# Patient Record
Sex: FEMALE | Race: WHITE | Employment: FULL TIME | URBAN - METROPOLITAN AREA
[De-identification: names, ages, dates, MRNs, and addresses within clinical notes are randomized per-mention and may not be internally consistent; named-entity substitution may affect disease eponyms.]

---

## 2018-06-25 ENCOUNTER — OFFICE VISIT (OUTPATIENT)
Dept: OBGYN | Facility: CLINIC | Age: 23
End: 2018-06-25
Payer: COMMERCIAL

## 2018-06-25 VITALS
WEIGHT: 169.4 LBS | BODY MASS INDEX: 25.09 KG/M2 | HEIGHT: 69 IN | SYSTOLIC BLOOD PRESSURE: 116 MMHG | DIASTOLIC BLOOD PRESSURE: 74 MMHG

## 2018-06-25 DIAGNOSIS — Z97.5 BREAKTHROUGH BLEEDING ON NEXPLANON: Primary | ICD-10-CM

## 2018-06-25 DIAGNOSIS — N92.1 BREAKTHROUGH BLEEDING ON NEXPLANON: Primary | ICD-10-CM

## 2018-06-25 DIAGNOSIS — Z13.29 SCREENING FOR THYROID DISORDER: ICD-10-CM

## 2018-06-25 PROCEDURE — 99203 OFFICE O/P NEW LOW 30 MIN: CPT | Performed by: ADVANCED PRACTICE MIDWIFE

## 2018-06-25 PROCEDURE — 36415 COLL VENOUS BLD VENIPUNCTURE: CPT | Performed by: ADVANCED PRACTICE MIDWIFE

## 2018-06-25 PROCEDURE — 84443 ASSAY THYROID STIM HORMONE: CPT | Performed by: ADVANCED PRACTICE MIDWIFE

## 2018-06-25 RX ORDER — NORGESTIMATE AND ETHINYL ESTRADIOL 0.25-0.035
1 KIT ORAL DAILY
Qty: 84 TABLET | Refills: 0 | Status: SHIPPED | OUTPATIENT
Start: 2018-06-25 | End: 2019-08-05

## 2018-06-25 NOTE — PROGRESS NOTES
SUBJECTIVE:                                                   Danyelle Davis is a 23 year old who presents to clinic today for the following health issue(s):  Patient presents with:  Abnormal Bleeding Problem: Nexplanon x 3.5 years.  Last inserted 2017.  Has noticed occ changes in bleeding rafa since 3/2018.      HPI: Here to discuss break through bleeding  On nexplanon. Had nexplanon replaced 2017, previous nexplanon did not have bleeding or spotting.  Since having new Nexplanon placed has had increased stress. Recently moved, changed jobs, loss of a relationship. 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 Patient states she has not changed diet, or other aspects and believes that stress is playing a role in irregular spotting/bleeding. LMP , states this was a normal period which she did not get on previous nexplanon. Pap UTD 2017 NIL.     Patient's last menstrual period was 2018.  Menstrual History: irregular   Patient is not sexually active at this time, recently started seeing new partner   .  Using Nexplanon  for contraception.   Health maintenance updated:  yes  STI infx testing offered:  Declined, no new partners since last neg test       Problem list and histories reviewed & adjusted, as indicated.  Additional history: as documented.    There is no problem list on file for this patient.    Past Surgical History:   Procedure Laterality Date     HC TOOTH EXTRACTION W/FORCEP  2014      Social History   Substance Use Topics     Smoking status: Never Smoker     Smokeless tobacco: Never Used     Alcohol use 1.2 - 1.8 oz/week     2 - 3 Glasses of wine per week      Problem (# of Occurrences) Relation (Name,Age of Onset)    Breast Cancer (1) Maternal Grandmother: great grandma also    Hypertension (1) Father: Smoker    No Known Problems (1) Mother            Current Outpatient Prescriptions   Medication Sig     etonogestrel (IMPLANON/NEXPLANON) 68 MG IMPL 1  "each by Subdermal route once     norgestimate-ethinyl estradiol (ORTHO-CYCLEN, SPRINTEC) 0.25-35 MG-MCG per tablet Take 1 tablet by mouth daily     No current facility-administered medications for this visit.      No Known Allergies    ROS:  CONSTITUTIONAL: NEGATIVE for fever, chills, change in weight  RESP: NEGATIVE for significant cough or SOB  CV: NEGATIVE for chest pain, palpitations or peripheral edema  GI: NEGATIVE for nausea, abdominal pain, heartburn, or change in bowel habits  : NEGATIVE for unusual urinary or vaginal symptoms. Periods are regular.  PSYCHIATRIC: NEGATIVE for changes in mood or affect    OBJECTIVE:     /74  Ht 5' 9\" (1.753 m)  Wt 169 lb 6.4 oz (76.8 kg)  LMP 06/17/2018  BMI 25.02 kg/m2  Body mass index is 25.02 kg/(m^2).    PHYSICAL EXAM:  Constitutional:  Appearance: Well nourished, well developed alert, in no acute distress  Chest:  Respiratory Effort:  Breathing unlabored  Cardiovascular: Heart: Auscultation:  Regular rate, normal rhythm, no murmurs present  Neurologic/Psychiatric:  Mental Status:  Oriented X3      In-Clinic Test Results:  No results found for this or any previous visit (from the past 24 hour(s)).    ASSESSMENT/PLAN:                                                        ICD-10-CM    1. Breakthrough bleeding on Nexplanon N92.1 norgestimate-ethinyl estradiol (ORTHO-CYCLEN, SPRINTEC) 0.25-35 MG-MCG per tablet    Z97.8    2. Screening for thyroid disorder Z13.29 TSH with free T4 reflex       PLAN:  -Rx given for 3 mo trial with LYNN, ACHES reviewed.  Expect break through bleeding To resolve on LYNN. Instructed to return to clinic if bleeding through LYNN.   -If break through bleeding  Continues after LYNN trial, may need to consider other BC options     MELISSA Salvador, LEONORA       30 minutes was spent face to face with the patient today discussing her history, diagnosis, and follow-up plan as noted above. Over 50% of the visit was spent in counseling and " coordination of care.

## 2018-06-25 NOTE — NURSING NOTE
"Chief Complaint   Patient presents with     Abnormal Bleeding Problem     Nexplanon x 3.5 years.  Last inserted 2017.  Has noticed occ changes in bleeding arfa since 3/2018.       Initial /74  Ht 5' 9\" (1.753 m)  Wt 169 lb 6.4 oz (76.8 kg)  LMP 2018  BMI 25.02 kg/m2 Estimated body mass index is 25.02 kg/(m^2) as calculated from the following:    Height as of this encounter: 5' 9\" (1.753 m).    Weight as of this encounter: 169 lb 6.4 oz (76.8 kg).  BP completed using cuff size: regular        The following HM Due: NONE      Pap smear done on this date: 2017 (approximately), by this group: unsure, results were Normal per pt.   Unable to retrieve care everywhere information without signature.  And pt has left the clinic.  Will retrieve and next visit    Marcelina Banuelos CMA               "

## 2018-06-25 NOTE — MR AVS SNAPSHOT
"              After Visit Summary   6/25/2018    Danyelle Davis    MRN: [id number]           Patient Information     Date Of Birth          1995        Visit Information        Provider Department      6/25/2018 11:45 AM Madalyn Mandujano APRN CNM [facility]        Today's Diagnoses     Breakthrough bleeding on Nexplanon    -  1    Screening for thyroid disorder           Follow-ups after your visit        Follow-up notes from your care team     Return in about 3 months (around 9/25/2018), or if symptoms worsen or fail to improve.      Who to contact     If you have questions or need follow up information about today's clinic visit or your schedule please contact [facility] directly at [phone].  Normal or non-critical lab and imaging results will be communicated to you by SugarCRMhart, letter or phone within 4 business days after the clinic has received the results. If you do not hear from us within 7 days, please contact the clinic through SugarCRMhart or phone. If you have a critical or abnormal lab result, we will notify you by phone as soon as possible.  Submit refill requests through [employer] or call your pharmacy and they will forward the refill request to us. Please allow 3 business days for your refill to be completed.          Additional Information About Your Visit        MyChart Information     [employer] gives you secure access to your electronic health record. If you see a primary care provider, you can also send messages to your care team and make appointments. If you have questions, please call your primary care clinic.  If you do not have a primary care provider, please call [phone] and they will assist you.        Care EveryWhere ID     This is your Care EveryWhere ID. This could be used by other organizations to access your [place] medical records  [id number]        Your Vitals Were     Height Last Period BMI (Body Mass Index)             5' 9\" (1.753 m) 06/17/2018 " 25.02 kg/m2          Blood Pressure from Last 3 Encounters:   06/25/18 116/74    Weight from Last 3 Encounters:   06/25/18 169 lb 6.4 oz (76.8 kg)              We Performed the Following     TSH with free T4 reflex          Today's Medication Changes          These changes are accurate as of 6/25/18  2:04 PM.  If you have any questions, ask your nurse or doctor.               Start taking these medicines.        Dose/Directions    norgestimate-ethinyl estradiol 0.25-35 MG-MCG per tablet   Commonly known as:  ORTHO-CYCLEN, SPRINTEC   Used for:  Breakthrough bleeding on Nexplanon   Started by:  Madalyn Mandujano APRN CNM        Dose:  1 tablet   Take 1 tablet by mouth daily   Quantity:  84 tablet   Refills:  0            Where to get your medicines      These medications were sent to [place] [id number] [address]  [facility]  [address] [facility] [address]     Phone:  [phone]     norgestimate-ethinyl estradiol 0.25-35 MG-MCG per tablet                Primary Care Provider Fax #    Physician No Ref-Primary [phone]       No address on file        Equal Access to Services     [facility] : Hadyeyo Jimenez, wamilvia kruger, qamiriam pang, randa culp . So [facility] [phone].    ATENCIÓN: Si habla español, tiene a murphy disposición servicios gratuitos de asistencia lingüística. [facility] [phone].    We comply with applicable federal civil rights laws and [place] laws. We do not discriminate on the basis of race, color, national origin, age, disability, sex, sexual orientation, or gender identity.            Thank you!     Thank you for choosing [facility]  for your care. Our goal is always to provide you with excellent care. Hearing back from our patients is one way we can continue to improve our services. Please take a few minutes to complete the written survey that you may receive in the mail after your visit  with us. Thank you!             Your Updated Medication List - Protect others around you: Learn how to safely use, store and throw away your medicines at www.disposemymeds.org.          This list is accurate as of 6/25/18  2:04 PM.  Always use your most recent med list.                   Brand Name Dispense Instructions for use Diagnosis    etonogestrel 68 MG Impl    IMPLANON/NEXPLANON     1 each by Subdermal route once        norgestimate-ethinyl estradiol 0.25-35 MG-MCG per tablet    ORTHO-CYCLEN, SPRINTEC    84 tablet    Take 1 tablet by mouth daily    Breakthrough bleeding on Nexplanon

## 2018-06-26 LAB — TSH SERPL DL<=0.005 MIU/L-ACNC: 0.96 MU/L (ref 0.4–4)

## 2018-06-27 ENCOUNTER — HEALTH MAINTENANCE LETTER (OUTPATIENT)
Age: 23
End: 2018-06-27

## 2019-07-08 ENCOUNTER — TELEPHONE (OUTPATIENT)
Dept: OBGYN | Facility: CLINIC | Age: 24
End: 2019-07-08

## 2019-07-08 NOTE — TELEPHONE ENCOUNTER
Panel Management Review      Composite cancer screening  Chart review shows that this patient is due/due soon for the following Pap Smear  Summary:    Patient is due/failing the following:   PAP    Action needed:   Patient needs office visit for PapSmear.    Type of outreach:    Sent letter.    Questions for provider review:    None                                                                                                                                    Lelo Boyd MA

## 2019-07-08 NOTE — LETTER
[place] [address]  [address]  [facility] [address]  Phone: [phone]      July 8, 2019    Danyelle Davis  [address] [place] [address]          Dear Danyelle Davis    This is a reminder that you are due for your follow-up Pap Smear.      It is important to follow up on this screening in order to detect any recurrence or continuation of abnormal cells, which could potentially progress to a cancerous condition.    You may call our office at [phone] to schedule an appointment at this clinic.    Please disregard this notice if you have had this procedure repeated or already made an appointment.        Sincerely,    Madalyn Perry CNM

## 2019-08-05 ENCOUNTER — OFFICE VISIT (OUTPATIENT)
Dept: OBGYN | Facility: CLINIC | Age: 24
End: 2019-08-05
Payer: COMMERCIAL

## 2019-08-05 VITALS — DIASTOLIC BLOOD PRESSURE: 68 MMHG | BODY MASS INDEX: 25.52 KG/M2 | SYSTOLIC BLOOD PRESSURE: 112 MMHG | WEIGHT: 172.8 LBS

## 2019-08-05 DIAGNOSIS — Z12.4 SCREENING FOR CERVICAL CANCER: ICD-10-CM

## 2019-08-05 DIAGNOSIS — Z01.419 ENCOUNTER FOR GYNECOLOGICAL EXAMINATION WITHOUT ABNORMAL FINDING: Primary | ICD-10-CM

## 2019-08-05 PROCEDURE — G0145 SCR C/V CYTO,THINLAYER,RESCR: HCPCS | Performed by: OBSTETRICS & GYNECOLOGY

## 2019-08-05 PROCEDURE — 99395 PREV VISIT EST AGE 18-39: CPT | Performed by: OBSTETRICS & GYNECOLOGY

## 2019-08-05 NOTE — NURSING NOTE
"Chief Complaint   Patient presents with     Physical     due for pap smear. No questions or concerns.        Initial /68 (BP Location: Right arm, Patient Position: Chair, Cuff Size: Adult Regular)   Wt 78.4 kg (172 lb 12.8 oz)   LMP 2019   BMI 25.52 kg/m   Estimated body mass index is 25.52 kg/m  as calculated from the following:    Height as of 18: 1.753 m (5' 9\").    Weight as of this encounter: 78.4 kg (172 lb 12.8 oz).  BP completed using cuff size: regular    Questioned patient about current smoking habits.  Pt. has never smoked.          The following HM Due: pap smear      Jared Wu CMA               "

## 2019-08-05 NOTE — PROGRESS NOTES
SUBJECTIVE:                                                   Danyelle Davis is a 24 year old  female who presents to clinic today for preventive health exam.   Periods are regular q 28-30 days. No intermenstrual bleeding,   spotting, or discharge. Not currently sexually active, no dyspareunia.  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     Current contraception: nexplanon, placed 2017  History of abnormal Pap smear: No    ROS:  Const: no weight changes, fever, chills  Breast: no nipple discharge, lumps, skin changes     Family history of breast CA: Yes (Please explain): see below  Family history of uterine/ovarian CA: No  Family history of colon CA: No    There is no problem list on file for this patient.    Past Surgical History:   Procedure Laterality Date     HC TOOTH EXTRACTION W/FORCEP  2014      Social History     Tobacco Use     Smoking status: Never Smoker     Smokeless tobacco: Never Used   Substance Use Topics     Alcohol use: Yes     Alcohol/week: 1.2 - 1.8 oz     Types: 2 - 3 Glasses of wine per week      Problem (# of Occurrences) Relation (Name,Age of Onset)    Breast Cancer (1) Maternal Grandmother: great grandma also    Hypertension (1) Father: Smoker    No Known Problems (1) Mother              Current Outpatient Medications on File Prior to Visit:  etonogestrel (IMPLANON/NEXPLANON) 68 MG IMPL 1 each by Subdermal route once     No current facility-administered medications on file prior to visit.   No Known Allergies    Problem list and histories reviewed and adjusted as indicated.     OBJECTIVE:     /68 (BP Location: Right arm, Patient Position: Chair, Cuff Size: Adult Regular)   Wt 78.4 kg (172 lb 12.8 oz)   LMP 2019   BMI 25.52 kg/m      Gen: Healthy appearing female, no acute distress, comfortable  HENT: No scleral injection or icterus  CV: Regular rate, well perfused, no murmur  Resp: normal work of breathing, no cough or  wheeze  Breast Exam: No visible masses or suspicious skin changes.  No discrete or dominant masses to palpation.  No axillary lymphadenopathy.  GI: Abdomen soft, non-tender. No masses, organomegaly  Skin: No suspicious lesions or rashes  Psychiatric: mentation appears normal and affect bright    : External genitalia normal well-estrogenized, healthy tissue.  No obvious excoriations, lesions, or rashes. Bartholins, urethra, skeins normal.  Normal pink vaginal mucosa.  SSE: Normal cervix, normal physiologic discharge.   Bimanual: No CMT, small mobile anteverted uterus. No adnexal masses or tenderness appreciated.     ASSESSMENT/PLAN:                                                    Danyelle Davis is a 24 year old female  with satisfactory annual exam.    PLAN:  Dx:  1)  Pap smear collected today  2)  Contraception nexplanon, due out 2020    PE:  Reviewed health maintenance including diet, regular exercise   and periodic exams.    Return to clinic in 1 year for routine annual exam.    COUNSELING:   Reviewed preventive health counseling, as reflected in patient instructions   reports that she has never smoked. She has never used smokeless tobacco.        Micaela Haile MD   Obstetrics and Gynecology

## 2019-08-05 NOTE — PATIENT INSTRUCTIONS
Preventive Health Recommendations  Female Ages 18 to 25      Yearly exam:      See your health care provider every year in order to    Review health changes.      Discuss preventive care.       Review your medicines if your doctor has prescribed any.        You should be tested each year for STDs (sexually transmitted diseases).        Starting at age 21, get a Pap test every three years. If you have an abnormal result, your doctor may have you test more often.        If you are at risk for diabetes, you should have a diabetes test (fasting glucose).    Shots:      Get a flu shot each year.      Get a tetanus shot every 10 years.      Consider getting the shot (vaccine) that prevents cervical cancer (Gardasil).    Nutrition:      Eat at least 5 servings of fruits and vegetables each day.    Eat whole-grain bread, whole-wheat pasta and brown rice, quinoa, faro, or barley instead of white grains and rice.    For bone health:  Eat calcium-rich foods or take calcium pills (500 to 600 mg) twice a day with food. Also take vitamin D (1000 IUs) each day.    Your total calcium intake should be 1000mg per day (3 servings of dairy daily or twice daily supplements)    Lifestyle    Exercise at least 150 minutes a week each week (30 minutes a day, 5 days a week). This will help you control your weight and prevent disease. If your goal is to lose weight you will need to increase the duration and intensity of your exercise regimen    Limit alcohol to one drink per day.    No smoking.      Wear sunscreen to prevent skin cancer.    See your dentist every six months for an exam and cleaning.

## 2019-08-08 LAB
COPATH REPORT: NORMAL
PAP: NORMAL

## 2019-12-20 ENCOUNTER — OFFICE VISIT (OUTPATIENT)
Dept: OBGYN | Facility: CLINIC | Age: 24
End: 2019-12-20
Payer: COMMERCIAL

## 2019-12-20 VITALS — DIASTOLIC BLOOD PRESSURE: 70 MMHG | WEIGHT: 170 LBS | BODY MASS INDEX: 25.1 KG/M2 | SYSTOLIC BLOOD PRESSURE: 118 MMHG

## 2019-12-20 DIAGNOSIS — Z11.3 SCREEN FOR STD (SEXUALLY TRANSMITTED DISEASE): ICD-10-CM

## 2019-12-20 DIAGNOSIS — Z97.5 BREAKTHROUGH BLEEDING ON NEXPLANON: Primary | ICD-10-CM

## 2019-12-20 DIAGNOSIS — N92.1 BREAKTHROUGH BLEEDING ON NEXPLANON: Primary | ICD-10-CM

## 2019-12-20 PROCEDURE — 99213 OFFICE O/P EST LOW 20 MIN: CPT | Performed by: OBSTETRICS & GYNECOLOGY

## 2019-12-20 PROCEDURE — 87491 CHLMYD TRACH DNA AMP PROBE: CPT | Performed by: OBSTETRICS & GYNECOLOGY

## 2019-12-20 PROCEDURE — 87591 N.GONORRHOEAE DNA AMP PROB: CPT | Performed by: OBSTETRICS & GYNECOLOGY

## 2019-12-20 NOTE — PROGRESS NOTES
SUBJECTIVE:                                                   Danyelle Davis is a 24 year old female who presents to clinic today for the following health issue(s):  Patient presents with:  Abnormal Uterine Bleeding: Irregular menses - frequent every 2 weeks x 2 months.  Skin changes / frustrated with menses      HPI:  Had Nexplanon placed  which was her second Nexplanon.  Had an episode of irregular bleeding in 2018 treated with OCPs which she only took briefly due to concern about side effects.    New sexual partner in last few months    No LMP recorded. Patient has had an implant..   Patient is sexually active, .  Using Nexplanon for contraception.     Problem list and histories reviewed & adjusted, as indicated.  Additional history: as documented.    There is no problem list on file for this patient.    Past Surgical History:   Procedure Laterality Date     HC TOOTH EXTRACTION W/FORCEP  2014      Social History     Tobacco Use     Smoking status: Never Smoker     Smokeless tobacco: Never Used   Substance Use Topics     Alcohol use: Yes     Alcohol/week: 2.0 - 3.0 standard drinks     Types: 2 - 3 Glasses of wine per week      Problem (# of Occurrences) Relation (Name,Age of Onset)    Breast Cancer (1) Maternal Grandmother: great grandma also    Hypertension (1) Father: Smoker    No Known Problems (1) Mother            etonogestrel (IMPLANON/NEXPLANON) 68 MG IMPL, 1 each by Subdermal route once    No current facility-administered medications on file prior to visit.     No Known Allergies    ROS:  5 point ROS negative except as noted above in HPI, including Gen., Resp., CV, GI &  system review.    OBJECTIVE:     /70   Wt 77.1 kg (170 lb)   BMI 25.10 kg/m     BMI: Body mass index is 25.1 kg/m .  General: Alert and oriented, no distress.  Psychiatric: Mood and affect within normal limits.  Abdomen: Soft, nontender, no hepatosplenomegaly, no rebound or guarding, no masses, no hernias.   Vulva:   No external lesions, normal female hair distribution, no inguinal adenopathy.    Urethra:  Midline, non-tender, well supported, no discharge  Vagina:  Well-estrogenized, no abnormal discharge, no lesions  Cervix: no lesions, no discharge and blood in vault.  GC/Chlamydia PCR obtained  Uterus:  anteverted, smooth contour, without enlargement, mobile, and without tenderness  Ovaries:  No masses appreciated, non-tender, mobile  Rectal Exam: deferred  Musculoskeletal: extremities normal        ASSESSMENT/PLAN:                                                        ICD-10-CM    1. Breakthrough bleeding on Nexplanon N92.1     Z97.5    2. Screen for STD (sexually transmitted disease) Z11.3 NEISSERIA GONORRHOEA PCR     CHLAMYDIA TRACHOMATIS PCR         Discussed options of management for BTB on Nexplanon  Expectant management, use of NSAIDs, LYNN and estrogen/progestin therapy all discussed.    Patient will attempt a trial of ibuprofen 600mg TID x 5-10 days.    Discussed Nexplanaon removal and alternative contraception if bleeding cannot be resolved.  Naldo Penaloza MD  [facility]

## 2019-12-23 LAB
C TRACH DNA SPEC QL NAA+PROBE: NEGATIVE
N GONORRHOEA DNA SPEC QL NAA+PROBE: NEGATIVE
SPECIMEN SOURCE: NORMAL
SPECIMEN SOURCE: NORMAL

## 2020-06-03 ENCOUNTER — OFFICE VISIT (OUTPATIENT)
Dept: OBGYN | Facility: CLINIC | Age: 25
End: 2020-06-03
Payer: COMMERCIAL

## 2020-06-03 ENCOUNTER — RESULTS ONLY (OUTPATIENT)
Dept: LAB | Age: 25
End: 2020-06-03

## 2020-06-03 ENCOUNTER — APPOINTMENT (OUTPATIENT)
Dept: LAB | Facility: CLINIC | Age: 25
End: 2020-06-03
Payer: COMMERCIAL

## 2020-06-03 VITALS — SYSTOLIC BLOOD PRESSURE: 114 MMHG | WEIGHT: 174.7 LBS | DIASTOLIC BLOOD PRESSURE: 70 MMHG | BODY MASS INDEX: 25.8 KG/M2

## 2020-06-03 DIAGNOSIS — N92.1 BREAKTHROUGH BLEEDING ON NEXPLANON: Primary | ICD-10-CM

## 2020-06-03 DIAGNOSIS — Z97.5 BREAKTHROUGH BLEEDING ON NEXPLANON: Primary | ICD-10-CM

## 2020-06-03 PROCEDURE — 99213 OFFICE O/P EST LOW 20 MIN: CPT | Performed by: OBSTETRICS & GYNECOLOGY

## 2020-06-03 NOTE — PROGRESS NOTES
"Chief Complaint   Patient presents with     Abnormal Uterine Bleeding   Continues with changes with menstrual bleeding - OCP rx is complete.   initial Wt 79.2 kg (174 lb 11.2 oz)   LMP 05/21/2020   BMI 25.80 kg/m   Estimated body mass index is 25.8 kg/m  as calculated from the following:    Height as of 6/25/18: 1.753 m (5' 9\").    Weight as of this encounter: 79.2 kg (174 lb 11.2 oz).  BP completed using cuff size regular.  Marcelina Banuelos CMA    "

## 2020-06-03 NOTE — PROGRESS NOTES
SUBJECTIVE:                                                   Danyelle Davis is a 25 year old female who presents to clinic today for the following health issue(s):  Patient presents with:  Abnormal Uterine Bleeding      HPI:  Patient had a Nexplanon inserted 2017 and has had persistent irregular bleeding.  Has tried NSAIDs and OCPs without improvement.  This is her 2nd Nexplanon and had a better bleeding pattern with her first.  Due for removal in 3 months    Discussed and options and will proceed with removal in near future to be followed by OCP use    Patient's last menstrual period was 2020..   Patient is sexually active, .  Using depo or other injectable for contraception.     Problem list and histories reviewed & adjusted, as indicated.  Additional history: as documented.    There is no problem list on file for this patient.    Past Surgical History:   Procedure Laterality Date     HC TOOTH EXTRACTION W/FORCEP  2014      Social History     Tobacco Use     Smoking status: Never Smoker     Smokeless tobacco: Never Used   Substance Use Topics     Alcohol use: Yes     Alcohol/week: 2.0 - 3.0 standard drinks     Types: 2 - 3 Glasses of wine per week      Problem (# of Occurrences) Relation (Name,Age of Onset)    Breast Cancer (1) Maternal Grandmother: great grandma also    Hypertension (1) Father: Smoker    No Known Problems (1) Mother            etonogestrel (IMPLANON/NEXPLANON) 68 MG IMPL, 1 each by Subdermal route once    No current facility-administered medications on file prior to visit.     No Known Allergies    ROS:  5 point ROS negative except as noted above in HPI, including Gen., Resp., CV, GI &  system review.    OBJECTIVE:     /70   Wt 79.2 kg (174 lb 11.2 oz)   LMP 2020   BMI 25.80 kg/m     BMI: Body mass index is 25.8 kg/m .  General: Alert and oriented, no distress.  Psychiatric: Mood and affect within normal limits.      ASSESSMENT/PLAN:                                                       Irregular bleeding on Nexplanon wo resolution with LYNN or NSAIDS.  Due for removal in 3 months anyway so will proceed to removal soon and plan on OCP use post removal.        Naldo Penaloza MD  [facility]

## 2020-06-08 LAB
COVID-19 SPIKE RBD ABY TITER: NORMAL
COVID-19 SPIKE RBD ABY: NEGATIVE

## 2020-06-10 ENCOUNTER — OFFICE VISIT (OUTPATIENT)
Dept: OBGYN | Facility: CLINIC | Age: 25
End: 2020-06-10
Payer: COMMERCIAL

## 2020-06-10 VITALS — SYSTOLIC BLOOD PRESSURE: 112 MMHG | BODY MASS INDEX: 25.7 KG/M2 | WEIGHT: 174 LBS | DIASTOLIC BLOOD PRESSURE: 72 MMHG

## 2020-06-10 DIAGNOSIS — Z30.46 ENCOUNTER FOR NEXPLANON REMOVAL: Primary | ICD-10-CM

## 2020-06-10 PROCEDURE — 11982 REMOVE DRUG IMPLANT DEVICE: CPT | Performed by: OBSTETRICS & GYNECOLOGY

## 2020-06-10 RX ORDER — LEVONORGESTREL/ETHIN.ESTRADIOL 0.1-0.02MG
1 TABLET ORAL DAILY
Qty: 84 TABLET | Refills: 3 | Status: SHIPPED | OUTPATIENT
Start: 2020-06-10 | End: 2021-04-15 | Stop reason: ALTCHOICE

## 2020-06-10 NOTE — NURSING NOTE
"Chief Complaint   Patient presents with     Minor Procedure     Nexplanon Removal     initial /72   Wt 78.9 kg (174 lb)   LMP 05/21/2020   BMI 25.70 kg/m   Estimated body mass index is 25.7 kg/m  as calculated from the following:    Height as of 6/25/18: 1.753 m (5' 9\").    Weight as of this encounter: 78.9 kg (174 lb).  BP completed using cuff size regular.  Marcelina Banuelos CMA    "

## 2020-06-10 NOTE — PROGRESS NOTES
alesseNexplanon Removal:     Is a pregnancy test required: No.  Was a consent obtained?  Yes    Danyelle Davis is here for removal of etonogestrel implant Nexplanon/Implanon    Indication: Irregular bleeding, persistent      Preoperative Diagnosis: etonogestrel implant  Postoperative Diagnosis: etonogestrel implant removed    Technique: On the left arm  Skin prep Betadine  Anesthesia 1% lidocaine, with epi  Procedure: Small incision (<5mm) was made at distal end of palpable implant, curved hemostat or mosquito forceps was used to isolate the implant and bring it to the incision, the fibrous capsule containing the implant  was incised and the Implant was removed intact.    EBL: minimal  Complications:  No  Tolerance:  Pt tolerated procedure well and was in stable condition.   Dressing:    A pressure bandage was placed for the next 12-24 hours.    Contraception was discussed and patient chose the following method oral contraceptives      Follow up: Pt was instructed to call if bleeding, severe pain or foul smell.     Naldo Penaloza MD

## 2020-09-10 ENCOUNTER — OFFICE VISIT (OUTPATIENT)
Dept: INTERNAL MEDICINE | Facility: CLINIC | Age: 25
End: 2020-09-10
Payer: COMMERCIAL

## 2020-09-10 VITALS
BODY MASS INDEX: 26.66 KG/M2 | SYSTOLIC BLOOD PRESSURE: 118 MMHG | RESPIRATION RATE: 15 BRPM | WEIGHT: 180 LBS | OXYGEN SATURATION: 98 % | HEART RATE: 66 BPM | TEMPERATURE: 98.4 F | HEIGHT: 69 IN | DIASTOLIC BLOOD PRESSURE: 80 MMHG

## 2020-09-10 DIAGNOSIS — R21 RASH: Primary | ICD-10-CM

## 2020-09-10 PROCEDURE — 99203 OFFICE O/P NEW LOW 30 MIN: CPT | Performed by: INTERNAL MEDICINE

## 2020-09-10 RX ORDER — TRIAMCINOLONE ACETONIDE 1 MG/G
CREAM TOPICAL 2 TIMES DAILY
Qty: 30 G | Refills: 0 | Status: SHIPPED | OUTPATIENT
Start: 2020-09-10 | End: 2022-11-18

## 2020-09-10 ASSESSMENT — MIFFLIN-ST. JEOR: SCORE: 1625.85

## 2020-09-10 NOTE — PROGRESS NOTES
"Subjective     Danyelle Davis is a 25 year old female who presents to clinic today for the following health issues:    HPI     New Patient/Transfer of Care- patient thought she was scheduled with Dermatology     Rash      Duration: Began in July     Description  Location: \"All over\" worse around torso, ears, and palms of hands   Itching: severe    Intensity:  moderate    Accompanying signs and symptoms: Raised skin reaction     History (similar episodes/previous evaluation): None    Precipitating or alleviating factors: Patient state her birth contol was switched in the beginning of June. Unsure if related   New exposures:  Medication- change in birth control prior to symptoms   Recent travel: no      Therapies tried and outcome: Cortisone cream, benadryl cream        Review of Systems   CONSTITUTIONAL: NEGATIVE for fever, chills, change in weight  EYES: NEGATIVE for vision changes or irritation  ENT/MOUTH: NEGATIVE for ear, mouth and throat problems  RESP: NEGATIVE for significant cough or SOB  CV: NEGATIVE for chest pain, palpitations or peripheral edema  GI: NEGATIVE for nausea, abdominal pain, heartburn, or change in bowel habits  : NEGATIVE for frequency, dysuria, or hematuria  MUSCULOSKELETAL: NEGATIVE for significant arthralgias or myalgia  NEURO: NEGATIVE for weakness, dizziness or paresthesias  HEME: NEGATIVE for bleeding problems  PSYCHIATRIC: NEGATIVE for changes in mood or affect      Objective    /80   Pulse 66   Temp 98.4  F (36.9  C) (Temporal)   Resp 15   Ht 1.753 m (5' 9\")   Wt 81.6 kg (180 lb)   LMP 08/24/2020 (Exact Date)   SpO2 98%   Breastfeeding No   BMI 26.58 kg/m    There is no height or weight on file to calculate BMI.  Physical Exam   GENERAL: alert and no distress  SKIN: No distinct nor obvious skin rash noted patient did show me a picture on her phone of prior rash noted to her upper arm consistent with what appeared to be dermatographia.  NEURO: Normal strength and tone, " mentation intact and speech normal  PSYCH: mentation appears normal, affect normal/bright        Assessment & Plan     Rash  Peers is almost dermatographia.  No distinct triggers.  Question related to recent birth control and medication change versus secondary allergy mediated response.  Suggest patient continue with Zyrtec 10 mg daily.  Discussed alternatives of switching back to original birth control to see if symptoms resolve and also benefit of seeing allergist for formal testing.  - triamcinolone (KENALOG) 0.1 % external cream; Apply topically 2 times daily Times 10-14 days  - ALLERGY/ASTHMA ADULT REFERRAL       See Patient Instructions    Return for if symptoms recur or worsen, f/u with routine sub-specialist.    Latrell Bess MD  [facility]

## 2020-09-29 ENCOUNTER — MYC MEDICAL ADVICE (OUTPATIENT)
Dept: OBGYN | Facility: CLINIC | Age: 25
End: 2020-09-29

## 2020-09-29 DIAGNOSIS — Z97.5 BREAKTHROUGH BLEEDING ON NEXPLANON: Primary | ICD-10-CM

## 2020-09-29 DIAGNOSIS — N92.1 BREAKTHROUGH BLEEDING ON NEXPLANON: Primary | ICD-10-CM

## 2020-09-29 RX ORDER — LEVONORGESTREL AND ETHINYL ESTRADIOL 0.15-0.03
1 KIT ORAL DAILY
Qty: 84 TABLET | Refills: 3 | Status: SHIPPED | OUTPATIENT
Start: 2020-09-29 | End: 2021-04-15 | Stop reason: ALTCHOICE

## 2020-09-29 NOTE — TELEPHONE ENCOUNTER
Please address the my chart message.  See also the previous my chart message.    DWIGHT Carrizales RN

## 2021-04-15 ENCOUNTER — OFFICE VISIT (OUTPATIENT)
Dept: OBGYN | Facility: CLINIC | Age: 26
End: 2021-04-15
Payer: COMMERCIAL

## 2021-04-15 VITALS
BODY MASS INDEX: 27.1 KG/M2 | DIASTOLIC BLOOD PRESSURE: 83 MMHG | SYSTOLIC BLOOD PRESSURE: 130 MMHG | HEART RATE: 65 BPM | WEIGHT: 183.5 LBS | OXYGEN SATURATION: 99 %

## 2021-04-15 DIAGNOSIS — Z30.09 GENERAL COUNSELING FOR PRESCRIPTION OF ORAL CONTRACEPTIVES: Primary | ICD-10-CM

## 2021-04-15 PROCEDURE — 99213 OFFICE O/P EST LOW 20 MIN: CPT | Performed by: OBSTETRICS & GYNECOLOGY

## 2021-04-15 RX ORDER — ACETAMINOPHEN AND CODEINE PHOSPHATE 120; 12 MG/5ML; MG/5ML
0.35 SOLUTION ORAL DAILY
Qty: 84 TABLET | Refills: 3 | Status: SHIPPED | OUTPATIENT
Start: 2021-04-15 | End: 2023-07-12

## 2021-04-15 ASSESSMENT — PAIN SCALES - GENERAL: PAINLEVEL: NO PAIN (0)

## 2021-04-15 NOTE — PATIENT INSTRUCTIONS
If you have any questions regarding your visit, Please contact your care team.    [facility]: [phone]      [facility] CLINIC HOURS TELEPHONE NUMBER   Radha Massey DO.    Padmini -  Myrna -     MANE Fischer RN     Monday, Wednesday, [facility] and [facility]  8:30a.m-5:00 p.m   [facility]  [address] [place] [address]  [phone] ask for [facility]    Imaging [phone]       Urgent Care locations:    [facility] Saturday and Sunday   9 am - 5 pm    Monday-Friday   11 pm - 7 pm  Saturday and Sunday   9 am - 5 pm   [phone]     [facility] Labor and Delivery:  [phone]    If you need a medication refill, please contact your pharmacy. Please allow 3 business days for your refill to be completed.  As always, Thank you for trusting us with your healthcare needs!

## 2021-04-15 NOTE — PROGRESS NOTES
This 27 y/o female, , LMP 2021, presents as a new patient to the [place] gyn dept to discuss her contraceptive options.  She previously used Nexplanon but had this removed prior to its expiration because of spotting issues.  She then tried Aviane but felt that this caused itchiness.  In retrospect, she feels that this symptom was due to her seasonal allergies.  Currently, she is taking Nordette but feels that this has caused decreased libido, headaches (not migraines), nausea, and feelings of sluggishness so prefers to switch to a different form of birth control since she is not ready to have a baby and this time.  However, she is planning to have children in the future.  She does not smoke and is not exposed to secondhand smoke.  /83 (BP Location: Right arm, Patient Position: Chair, Cuff Size: Adult Regular)   Pulse 65   Wt 83.2 kg (183 lb 8 oz)   LMP 2021   SpO2 99%   Breastfeeding No   BMI 27.10 kg/m    ROS:  10 systems were reviewed and the positives were listed under problems.  PE:  General- Healthy-appearing female in no acute distress  Eyes- No scleral injection or icterus  ENT- Mucus membranes moist  CV- No noticeable edema in extremities  Lymph- No noticeable cervical adenopathy  Respiratory- Non-labored breathing  Skin- No suspicious lesions or rashes visualized  Psych- Normal affect  Assessment - Contraceptive consult  Plan - Her contraceptive options were discussed and she would like to switch from Nordette to Micronor after she completes her current pill pack.  Instructions on use were reviewed with the patient and all her questions and concerns were addressed.  20 minutes were spent today in chart review, the patient visit, review of tests, and documentation in regards to the issues noted above.

## 2022-03-21 ENCOUNTER — VIRTUAL VISIT (OUTPATIENT)
Dept: FAMILY MEDICINE | Facility: CLINIC | Age: 27
End: 2022-03-21
Payer: COMMERCIAL

## 2022-03-21 DIAGNOSIS — L70.0 ACNE VULGARIS: Primary | ICD-10-CM

## 2022-03-21 PROCEDURE — 99213 OFFICE O/P EST LOW 20 MIN: CPT | Mod: 95 | Performed by: PHYSICIAN ASSISTANT

## 2022-03-21 RX ORDER — TRETINOIN 0.25 MG/G
CREAM TOPICAL AT BEDTIME
Qty: 45 G | Refills: 3 | Status: SHIPPED | OUTPATIENT
Start: 2022-03-21 | End: 2022-11-18

## 2022-03-21 NOTE — PROGRESS NOTES
Answers for HPI/ROS submitted by the patient on 3/19/2022  How many servings of fruits and vegetables do you eat daily?: 2-3  On average, how many sweetened beverages do you drink each day (Examples: soda, juice, sweet tea, etc.  Do NOT count diet or artificially sweetened beverages)?: 0  How many minutes a day do you exercise enough to make your heart beat faster?: 30 to 60  How many days a week do you exercise enough to make your heart beat faster?: 4  How many days per week do you miss taking your medication?: 0  What is the reason for your visit today?: Acne  When did your symptoms begin?: More than a month  What are your symptoms?: Acne  How would you describe these symptoms?: Moderate  Are your symptoms:: Staying the same  Have you had these symptoms before?: Yes  Have you tried or received treatment for these symptoms before?: Yes  Did that treatment work? : No  Is there anything that makes you feel worse?: No  Is there anything that makes you feel better?: No    Would like a Rx for acne.    Danyelle is a 27 year old who is being evaluated via a billable video visit.        Video Start Time: 10:38 AM    Assessment & Plan     Acne vulgaris  New prescription for tretinoin. Has used retinol. Suggest every other day use for a few weeks - then can increase to daily. Discussed side effects of dryness, redness, skin sensitivity. No further questions. I discussed with the patient risks and benefits of the new medication prescribed including potential side effects.  The patient had opportunity to ask questions and is comfortable with and interested in medications as prescribed.   - tretinoin (RETIN-A) 0.025 % external cream; Apply topically At Bedtime                 Return in about 5 months (around 8/21/2022) for Routine preventive with PAP.    Madalyn Orozco PA-C  [facility] NABILA Bassett is a 27 year old who presents for the following health issues     HPI       Patient would like to  add retinoid to her current skin care regimen. Has not had a prescription for this in the past.           Review of Systems   Constitutional, HEENT, cardiovascular, pulmonary, gi and gu systems are negative, except as otherwise noted.      Objective           Vitals:  No vitals were obtained today due to virtual visit.    Physical Exam   GENERAL: Healthy, alert and no distress  EYES: Eyes grossly normal to inspection.  No discharge or erythema, or obvious scleral/conjunctival abnormalities.  RESP: No audible wheeze, cough, or visible cyanosis.  No visible retractions or increased work of breathing.    SKIN: Visible skin clear. No significant rash, abnormal pigmentation or lesions.  NEURO: Cranial nerves grossly intact.  Mentation and speech appropriate for age.  PSYCH: Mentation appears normal, affect normal/bright, judgement and insight intact, normal speech and appearance well-groomed.                Video-Visit Details    Type of service:  Video Visit    Video End Time:10:45 AM    Originating Location (pt. Location): Home    Distant Location (provider location):  [facility]     Platform used for Video Visit: CHAINels

## 2022-11-18 ENCOUNTER — VIRTUAL VISIT (OUTPATIENT)
Dept: FAMILY MEDICINE | Facility: CLINIC | Age: 27
End: 2022-11-18
Payer: COMMERCIAL

## 2022-11-18 DIAGNOSIS — L70.0 ACNE VULGARIS: ICD-10-CM

## 2022-11-18 PROCEDURE — 99213 OFFICE O/P EST LOW 20 MIN: CPT | Mod: GT | Performed by: NURSE PRACTITIONER

## 2022-11-18 RX ORDER — TRETINOIN 0.5 MG/G
CREAM TOPICAL AT BEDTIME
Qty: 45 G | Refills: 1 | Status: SHIPPED | OUTPATIENT
Start: 2022-11-18 | End: 2023-07-12

## 2022-11-18 NOTE — PROGRESS NOTES
"Danyelle is a 27 year old who is being evaluated via a billable video visit.      How would you like to obtain your AVS? MyChart  If the video visit is dropped, the invitation should be resent by: Text to cell phone: [phone]  Will anyone else be joining your video visit? No          Assessment & Plan     Acne vulgaris  Prescription strength increased.   - tretinoin (RETIN-A) 0.05 % external cream; Apply topically At Bedtime        No follow-ups on file.    MELISSA Molina [facility]    Kael Bassett is a 27 year old, presenting for the following health issues:  Medication Request (Acne)      History of Present Illness       Reason for visit:  Acne    She eats 2-3 servings of fruits and vegetables daily.She consumes 0 sweetened beverage(s) daily.She exercises with enough effort to increase her heart rate 30 to 60 minutes per day.  She exercises with enough effort to increase her heart rate 4 days per week.   She is taking medications regularly.       She would like to increase her Retin-A prescription to 0.05%.  Will be getting  in June and trying to improve her skin.  Skin has worsened with the changing weather, usually improves in the summer.  Using once a day at night.    Review of Systems   Constitutional, HEENT, cardiovascular, pulmonary, gi and gu systems are negative, except as otherwise noted.      Objective    Vitals - Patient Reported  Weight (Patient Reported): 81.6 kg (180 lb)  Height (Patient Reported): 175.3 cm (5' 9\")  BMI (Based on Pt Reported Ht/Wt): 26.58  Pain Score: No Pain (0)        Physical Exam   GENERAL: Healthy, alert and no distress  EYES: Eyes grossly normal to inspection.  No discharge or erythema, or obvious scleral/conjunctival abnormalities.  RESP: No audible wheeze, cough, or visible cyanosis.  No visible retractions or increased work of breathing.    SKIN: Visible skin clear. No significant rash, abnormal pigmentation or " lesions.  NEURO: Cranial nerves grossly intact.  Mentation and speech appropriate for age.  PSYCH: Mentation appears normal, affect normal/bright, judgement and insight intact, normal speech and appearance well-groomed.                Video-Visit Details    Video Start Time: 1615    Type of service:  Video Visit    Video End Time:1619    Originating Location (pt. Location): Home    Distant Location (provider location):  On-site    Platform used for Video Visit: Yashira

## 2023-07-12 ENCOUNTER — OFFICE VISIT (OUTPATIENT)
Dept: FAMILY MEDICINE | Facility: CLINIC | Age: 28
End: 2023-07-12
Payer: COMMERCIAL

## 2023-07-12 VITALS
HEIGHT: 69 IN | HEART RATE: 55 BPM | WEIGHT: 171.1 LBS | BODY MASS INDEX: 25.34 KG/M2 | DIASTOLIC BLOOD PRESSURE: 75 MMHG | TEMPERATURE: 98.6 F | RESPIRATION RATE: 14 BRPM | SYSTOLIC BLOOD PRESSURE: 123 MMHG | OXYGEN SATURATION: 98 %

## 2023-07-12 DIAGNOSIS — Z12.4 CERVICAL CANCER SCREENING: ICD-10-CM

## 2023-07-12 DIAGNOSIS — Z23 NEED FOR TDAP VACCINATION: ICD-10-CM

## 2023-07-12 DIAGNOSIS — Z00.00 ANNUAL PHYSICAL EXAM: Primary | ICD-10-CM

## 2023-07-12 DIAGNOSIS — Z11.4 ENCOUNTER FOR SCREENING FOR HIV: ICD-10-CM

## 2023-07-12 DIAGNOSIS — Z11.59 NEED FOR HEPATITIS C SCREENING TEST: ICD-10-CM

## 2023-07-12 DIAGNOSIS — Z13.1 SCREENING FOR DIABETES MELLITUS: ICD-10-CM

## 2023-07-12 DIAGNOSIS — Z13.220 SCREENING FOR LIPID DISORDERS: ICD-10-CM

## 2023-07-12 DIAGNOSIS — Z23 HIGH PRIORITY FOR 2019-NCOV VACCINE: ICD-10-CM

## 2023-07-12 PROCEDURE — G0145 SCR C/V CYTO,THINLAYER,RESCR: HCPCS | Performed by: INTERNAL MEDICINE

## 2023-07-12 PROCEDURE — 0124A COVID-19 BIVALENT 12+ (PFIZER): CPT | Performed by: INTERNAL MEDICINE

## 2023-07-12 PROCEDURE — 90715 TDAP VACCINE 7 YRS/> IM: CPT | Performed by: INTERNAL MEDICINE

## 2023-07-12 PROCEDURE — 91312 COVID-19 BIVALENT 12+ (PFIZER): CPT | Performed by: INTERNAL MEDICINE

## 2023-07-12 PROCEDURE — 99395 PREV VISIT EST AGE 18-39: CPT | Mod: 25 | Performed by: INTERNAL MEDICINE

## 2023-07-12 PROCEDURE — 90471 IMMUNIZATION ADMIN: CPT | Performed by: INTERNAL MEDICINE

## 2023-07-12 ASSESSMENT — ENCOUNTER SYMPTOMS
HEADACHES: 0
PARESTHESIAS: 0
FEVER: 0
SORE THROAT: 0
HEMATURIA: 0
PALPITATIONS: 0
ABDOMINAL PAIN: 0
DIZZINESS: 0
CONSTIPATION: 0
ARTHRALGIAS: 0
HEARTBURN: 0
FREQUENCY: 0
NAUSEA: 0
DIARRHEA: 0
BREAST MASS: 0
NERVOUS/ANXIOUS: 0
EYE PAIN: 0
DYSURIA: 0
CHILLS: 0
MYALGIAS: 0
WEAKNESS: 0
SHORTNESS OF BREATH: 0
HEMATOCHEZIA: 0
JOINT SWELLING: 0
COUGH: 0

## 2023-07-12 ASSESSMENT — PAIN SCALES - GENERAL: PAINLEVEL: NO PAIN (0)

## 2023-07-12 NOTE — NURSING NOTE
Prior to immunization administration, verified patients identity using patient s name and date of birth. Please see Immunization Activity for additional information.     Screening Questionnaire for Adult Immunization    Are you sick today?   No   Do you have allergies to medications, food, a vaccine component or latex?   No   Have you ever had a serious reaction after receiving a vaccination?   No   Do you have a long-term health problem with heart, lung, kidney, or metabolic disease (e.g., diabetes), asthma, a blood disorder, no spleen, complement component deficiency, a cochlear implant, or a spinal fluid leak?  Are you on long-term aspirin therapy?   No   Do you have cancer, leukemia, HIV/AIDS, or any other immune system problem?   No   Do you have a parent, brother, or sister with an immune system problem?   No   In the past 3 months, have you taken medications that affect  your immune system, such as prednisone, other steroids, or anticancer drugs; drugs for the treatment of rheumatoid arthritis, Crohn s disease, or psoriasis; or have you had radiation treatments?   No   Have you had a seizure, or a brain or other nervous system problem?   No   During the past year, have you received a transfusion of blood or blood    products, or been given immune (gamma) globulin or antiviral drug?   No   For women: Are you pregnant or is there a chance you could become       pregnant during the next month?   No   Have you received any vaccinations in the past 4 weeks?   No     Immunization questionnaire answers were all negative.      Patient instructed to remain in clinic for 15 minutes afterwards, and to report any adverse reactions.     Screening performed by Ca Horowitz MA on 7/12/2023 at 9:02 AM.

## 2023-07-12 NOTE — PROGRESS NOTES
SUBJECTIVE:   CC: Danyelle is an 28 year old who presents for preventive health visit.     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  She offers no concerns.  Recently  about a month ago.  Looking at starting family perhaps next year or or 2.  Menstrual periods are regular.  Denies being depressed or anxious.  Sleeps well.  Non-smoker and does not abuse alcohol.         No data to display              Healthy Habits:     Getting at least 3 servings of Calcium per day:  Yes    Bi-annual eye exam:  Yes    Dental care twice a year:  Yes    Sleep apnea or symptoms of sleep apnea:  None    Diet:  Regular (no restrictions)    Frequency of exercise:  4-5 days/week    Duration of exercise:  45-60 minutes    Taking medications regularly:  Yes    Medication side effects:  None    Additional concerns today:  No      Today's PHQ-2 Score:       7/12/2023     7:48 AM   PHQ-2 ( 1999 [employer])   Q1: Little interest or pleasure in doing things 0   Q2: Feeling down, depressed or hopeless 0   PHQ-2 Score 0   Q1: Little interest or pleasure in doing things Not at all   Q2: Feeling down, depressed or hopeless Not at all   PHQ-2 Score 0         Have you ever done Advance Care Planning? (For example, a Health Directive, POLST, or a discussion with a medical provider or your loved ones about your wishes): No, advance care planning information given to patient to review.  Patient declined advance care planning discussion at this time.    Social History     Tobacco Use     Smoking status: Never     Smokeless tobacco: Never   Substance Use Topics     Alcohol use: Yes     Alcohol/week: 2.0 - 3.0 standard drinks of alcohol     Types: 2 - 3 Glasses of wine per week             7/12/2023     7:48 AM   Alcohol Use   Prescreen: >3 drinks/day or >7 drinks/week? No     Reviewed orders with patient.  Reviewed health maintenance and updated orders accordingly - Yes  BP Readings from Last 3 Encounters:   07/12/23  123/75   04/15/21 130/83   09/10/20 118/80    Wt Readings from Last 3 Encounters:   07/12/23 77.6 kg (171 lb 1.6 oz)   04/15/21 83.2 kg (183 lb 8 oz)   09/10/20 81.6 kg (180 lb)                  There is no problem list on file for this patient.    Past Surgical History:   Procedure Laterality Date     HC TOOTH EXTRACTION W/FORCEP  02/2014       Social History     Tobacco Use     Smoking status: Never     Smokeless tobacco: Never   Substance Use Topics     Alcohol use: Yes     Alcohol/week: 2.0 - 3.0 standard drinks of alcohol     Types: 2 - 3 Glasses of wine per week     Comment: Social; 2-3/weekend     Family History   Problem Relation Age of Onset     No Known Problems Mother      Hypertension Father         Smoker     Breast Cancer Maternal Grandmother         great grandma also     Cancer Paternal Grandfather 80         Current Outpatient Medications   Medication Sig Dispense Refill     norethindrone (MICRONOR) 0.35 MG tablet Take 1 tablet (0.35 mg) by mouth daily 84 tablet 3     tretinoin (RETIN-A) 0.05 % external cream Apply topically At Bedtime (Patient not taking: Reported on 7/12/2023) 45 g 1     No Known Allergies    Breast Cancer Screening:    FHS-7:       7/12/2023     7:50 AM   Breast CA Risk Assessment (FHS-7)   Did any of your first-degree relatives have breast or ovarian cancer? No   Did any of your relatives have bilateral breast cancer? Yes   Did any man in your family have breast cancer? No   Did any woman in your family have breast and ovarian cancer? Yes   Did any woman in your family have breast cancer before age 50 y? No   Do you have 2 or more relatives with breast and/or ovarian cancer? Yes   Do you have 2 or more relatives with breast and/or bowel cancer? Yes         Pertinent mammograms are reviewed under the imaging tab.    History of abnormal Pap smear: NO - age 21-29 PAP every 3 years recommended      8/5/2019    10:45 AM   PAP / HPV   PAP (Historical) NIL      Reviewed and updated as  needed this visit by clinical staff                  Reviewed and updated as needed this visit by Provider                 Past Medical History:   Diagnosis Date     NO ACTIVE PROBLEMS 06/25/2018      Past Surgical History:   Procedure Laterality Date     HC TOOTH EXTRACTION W/FORCEP  02/2014       Review of Systems   Constitutional: Negative for chills and fever.   HENT: Negative for congestion, ear pain, hearing loss and sore throat.    Eyes: Negative for pain and visual disturbance.   Respiratory: Negative for cough and shortness of breath.    Cardiovascular: Negative for chest pain, palpitations and peripheral edema.   Gastrointestinal: Negative for abdominal pain, constipation, diarrhea, heartburn, hematochezia and nausea.   Breasts:  Negative for tenderness, breast mass and discharge.   Genitourinary: Negative for dysuria, frequency, genital sores, hematuria, pelvic pain, urgency, vaginal bleeding and vaginal discharge.   Musculoskeletal: Negative for arthralgias, joint swelling and myalgias.   Skin: Negative for rash.   Neurological: Negative for dizziness, weakness, headaches and paresthesias.   Psychiatric/Behavioral: Negative for mood changes. The patient is not nervous/anxious.      CONSTITUTIONAL: NEGATIVE for fever, chills, change in weight  INTEGUMENTARU/SKIN: NEGATIVE for worrisome rashes, moles or lesions  EYES: NEGATIVE for vision changes or irritation  ENT: NEGATIVE for ear, mouth and throat problems  RESP: NEGATIVE for significant cough or SOB  BREAST: NEGATIVE for masses, tenderness or discharge  CV: NEGATIVE for chest pain, palpitations or peripheral edema  GI: NEGATIVE for nausea, abdominal pain, heartburn, or change in bowel habits  : NEGATIVE for unusual urinary or vaginal symptoms. Periods are regular.  MUSCULOSKELETAL: NEGATIVE for significant arthralgias or myalgia  NEURO: NEGATIVE for weakness, dizziness or paresthesias  ENDOCRINE: NEGATIVE for temperature intolerance, skin/hair  changes  HEME/ALLERGY/IMMUNE: NEGATIVE for bleeding problems  PSYCHIATRIC: NEGATIVE for changes in mood or affect     OBJECTIVE:   There were no vitals taken for this visit.  Physical Exam  GENERAL: healthy, alert and no distress  EYES: Eyes grossly normal to inspection, PERRL and conjunctivae and sclerae normal  HENT: ear canals and TM's normal, nose and mouth without ulcers or lesions  NECK: no adenopathy, no asymmetry, masses, or scars and thyroid normal to palpation  RESP: lungs clear to auscultation - no rales, rhonchi or wheezes  BREAST: normal without masses, tenderness or nipple discharge and no palpable axillary masses or adenopathy  CV: regular rate and rhythm, normal S1 S2, no S3 or S4, no murmur, click or rub, no peripheral edema and peripheral pulses strong  ABDOMEN: soft, nontender, no hepatosplenomegaly, no masses and bowel sounds normal   (female): normal female external genitalia, normal urethral meatus, vaginal mucosa pink, moist, well rugated, and normal cervix/adnexa/uterus without masses or discharge.  Pap smear obtained from's cervical os using a brush.  MS: no gross musculoskeletal defects noted, no edema  SKIN: no suspicious lesions or rashes  NEURO: Normal strength and tone, mentation intact and speech normal  PSYCH: mentation appears normal, affect normal/bright  LYMPH: no cervical, supraclavicular, axillary, or inguinal adenopathy        ASSESSMENT/PLAN:     1.  Annual preventive physical exam.  Exam was normal.  2.  Cervical cancer screening.  Pap smear performed.  Patient will be informed of the results.  3. Bivalent COVID-vaccine recommended.  4.  Tdap vaccine recommended.  5.  Screening for diabetes, dyslipidemia, hepatitis C and HIV.  I will get back to the results.      Patient has been advised of split billing requirements and indicates understanding: Yes      COUNSELING:  Reviewed preventive health counseling, as reflected in patient instructions        She reports that she has  never smoked. She has never used smokeless tobacco.      Raymond العلي MD  [facility]

## 2023-07-16 LAB
BKR LAB AP GYN ADEQUACY: NORMAL
BKR LAB AP GYN INTERPRETATION: NORMAL
BKR LAB AP HPV REFLEX: NORMAL
BKR LAB AP PREVIOUS ABNORMAL: NORMAL
PATH REPORT.COMMENTS IMP SPEC: NORMAL
PATH REPORT.COMMENTS IMP SPEC: NORMAL
PATH REPORT.RELEVANT HX SPEC: NORMAL

## 2023-07-19 ENCOUNTER — LAB (OUTPATIENT)
Dept: LAB | Facility: CLINIC | Age: 28
End: 2023-07-19
Payer: COMMERCIAL

## 2023-07-19 DIAGNOSIS — Z11.4 ENCOUNTER FOR SCREENING FOR HIV: ICD-10-CM

## 2023-07-19 DIAGNOSIS — Z11.59 NEED FOR HEPATITIS C SCREENING TEST: ICD-10-CM

## 2023-07-19 DIAGNOSIS — Z13.1 SCREENING FOR DIABETES MELLITUS: ICD-10-CM

## 2023-07-19 DIAGNOSIS — Z13.220 SCREENING FOR LIPID DISORDERS: ICD-10-CM

## 2023-07-19 LAB
CHOLEST SERPL-MCNC: 148 MG/DL
FASTING STATUS PATIENT QL REPORTED: YES
GLUCOSE SERPL-MCNC: 89 MG/DL (ref 70–99)
HCV AB SERPL QL IA: NONREACTIVE
HDLC SERPL-MCNC: 77 MG/DL
HIV 1+2 AB+HIV1 P24 AG SERPL QL IA: NONREACTIVE
LDLC SERPL CALC-MCNC: 62 MG/DL
NONHDLC SERPL-MCNC: 71 MG/DL
TRIGL SERPL-MCNC: 45 MG/DL

## 2023-07-19 PROCEDURE — 86803 HEPATITIS C AB TEST: CPT

## 2023-07-19 PROCEDURE — 82947 ASSAY GLUCOSE BLOOD QUANT: CPT

## 2023-07-19 PROCEDURE — 36415 COLL VENOUS BLD VENIPUNCTURE: CPT

## 2023-07-19 PROCEDURE — 80061 LIPID PANEL: CPT

## 2023-07-19 PROCEDURE — 87389 HIV-1 AG W/HIV-1&-2 AB AG IA: CPT
